# Patient Record
Sex: MALE | Race: WHITE | NOT HISPANIC OR LATINO | Employment: OTHER | URBAN - METROPOLITAN AREA
[De-identification: names, ages, dates, MRNs, and addresses within clinical notes are randomized per-mention and may not be internally consistent; named-entity substitution may affect disease eponyms.]

---

## 2017-08-31 ENCOUNTER — DOCUMENTATION ONLY (OUTPATIENT)
Dept: REHABILITATION | Facility: HOSPITAL | Age: 72
End: 2017-08-31

## 2017-08-31 NOTE — PROGRESS NOTES
PHYSICAL THERAPY DISCHARGE:    This patient was originally evaluated at our facility on: 11/7/16         Pt attended PT for a total of 6 Visits receiving: Manual Therapy, Therex, Postural Education, Body Mechanics Training, Home Exercise Instruction     This patient's last visit occurred on: 11/30/16      Short term goals were achieved: no formal re-evaluation secondary to non-compliance    Long term goals were achieved: no formal re-evaluation secondary to non-compliance    Pt was DC'd from our care secondary to: non-compliance       Therapist: Justin Avelar, PT  8/31/2017